# Patient Record
Sex: MALE | Race: WHITE
[De-identification: names, ages, dates, MRNs, and addresses within clinical notes are randomized per-mention and may not be internally consistent; named-entity substitution may affect disease eponyms.]

---

## 2018-06-03 ENCOUNTER — HOSPITAL ENCOUNTER (EMERGENCY)
Dept: HOSPITAL 12 - ER | Age: 68
Discharge: HOME | End: 2018-06-03
Payer: MEDICARE

## 2018-06-03 VITALS — HEIGHT: 75 IN | WEIGHT: 241.12 LBS | BODY MASS INDEX: 29.98 KG/M2

## 2018-06-03 DIAGNOSIS — G56.20: Primary | ICD-10-CM

## 2018-06-03 DIAGNOSIS — E78.5: ICD-10-CM

## 2018-06-03 DIAGNOSIS — I10: ICD-10-CM

## 2018-06-03 DIAGNOSIS — Z87.891: ICD-10-CM

## 2018-06-03 PROCEDURE — A4663 DIALYSIS BLOOD PRESSURE CUFF: HCPCS

## 2018-06-03 NOTE — NUR
Patient discharged to home in stable conditon.  Written and verbal after care 
instructions given to patient and private caregiver. Patient and caregiver 
verbalized understanding of instructions.